# Patient Record
Sex: MALE | Race: WHITE | Employment: FULL TIME | URBAN - METROPOLITAN AREA
[De-identification: names, ages, dates, MRNs, and addresses within clinical notes are randomized per-mention and may not be internally consistent; named-entity substitution may affect disease eponyms.]

---

## 2019-05-15 ENCOUNTER — HOSPITAL ENCOUNTER (OUTPATIENT)
Dept: ULTRASOUND IMAGING | Age: 49
Discharge: HOME | End: 2019-05-15
Attending: FAMILY MEDICINE

## 2019-05-15 DIAGNOSIS — R10.11 RIGHT UPPER QUADRANT ABDOMINAL PAIN: ICD-10-CM

## 2019-05-18 ENCOUNTER — HOSPITAL ENCOUNTER (OUTPATIENT)
Dept: CT IMAGING | Age: 49
Discharge: HOME | End: 2019-05-18
Attending: FAMILY MEDICINE
Payer: COMMERCIAL

## 2019-05-18 DIAGNOSIS — R10.84 GENERALIZED ABDOMINAL PAIN: ICD-10-CM

## 2019-05-18 PROCEDURE — 74150 CT ABDOMEN W/O CONTRAST: CPT

## 2022-01-07 NOTE — PROGRESS NOTES
Called patient to confirm scheduled procedure and notify him of cancellation. No answer at this time. Voicemail message left informing patient to call GI lab back.

## 2022-01-10 ENCOUNTER — HOSPITAL ENCOUNTER (OUTPATIENT)
Age: 52
Setting detail: OUTPATIENT SURGERY
Discharge: HOME | End: 2022-01-10
Attending: SURGERY | Admitting: SURGERY
Payer: COMMERCIAL

## 2022-01-10 ENCOUNTER — ANESTHESIA (OUTPATIENT)
Dept: ENDOSCOPY | Age: 52
End: 2022-01-10
Payer: COMMERCIAL

## 2022-01-10 ENCOUNTER — ANESTHESIA EVENT (OUTPATIENT)
Dept: ENDOSCOPY | Age: 52
End: 2022-01-10
Payer: COMMERCIAL

## 2022-01-10 VITALS
HEART RATE: 76 BPM | SYSTOLIC BLOOD PRESSURE: 120 MMHG | DIASTOLIC BLOOD PRESSURE: 78 MMHG | RESPIRATION RATE: 14 BRPM | TEMPERATURE: 97.7 F | OXYGEN SATURATION: 99 %

## 2022-01-10 DIAGNOSIS — Z12.11 COLON CANCER SCREENING: ICD-10-CM

## 2022-01-10 DIAGNOSIS — K57.30 DIVERTICULAR DISEASE OF LEFT COLON: ICD-10-CM

## 2022-01-10 PROCEDURE — 74011250636 HC RX REV CODE- 250/636: Performed by: ANESTHESIOLOGY

## 2022-01-10 PROCEDURE — 74011000250 HC RX REV CODE- 250: Performed by: REGISTERED NURSE

## 2022-01-10 PROCEDURE — 74011250636 HC RX REV CODE- 250/636: Performed by: REGISTERED NURSE

## 2022-01-10 PROCEDURE — 2709999900 HC NON-CHARGEABLE SUPPLY: Performed by: SURGERY

## 2022-01-10 PROCEDURE — 76060000032 HC ANESTHESIA 0.5 TO 1 HR: Performed by: SURGERY

## 2022-01-10 PROCEDURE — 45378 DIAGNOSTIC COLONOSCOPY: CPT | Performed by: SURGERY

## 2022-01-10 PROCEDURE — 76040000025: Performed by: SURGERY

## 2022-01-10 RX ORDER — SODIUM CHLORIDE, SODIUM LACTATE, POTASSIUM CHLORIDE, CALCIUM CHLORIDE 600; 310; 30; 20 MG/100ML; MG/100ML; MG/100ML; MG/100ML
1000 INJECTION, SOLUTION INTRAVENOUS CONTINUOUS
Status: DISCONTINUED | OUTPATIENT
Start: 2022-01-10 | End: 2022-01-10 | Stop reason: HOSPADM

## 2022-01-10 RX ORDER — GLYCOPYRROLATE 0.2 MG/ML
INJECTION INTRAMUSCULAR; INTRAVENOUS AS NEEDED
Status: DISCONTINUED | OUTPATIENT
Start: 2022-01-10 | End: 2022-01-10 | Stop reason: HOSPADM

## 2022-01-10 RX ORDER — PROPOFOL 10 MG/ML
INJECTION, EMULSION INTRAVENOUS
Status: DISCONTINUED | OUTPATIENT
Start: 2022-01-10 | End: 2022-01-10 | Stop reason: HOSPADM

## 2022-01-10 RX ORDER — LIDOCAINE HYDROCHLORIDE 20 MG/ML
INJECTION, SOLUTION EPIDURAL; INFILTRATION; INTRACAUDAL; PERINEURAL AS NEEDED
Status: DISCONTINUED | OUTPATIENT
Start: 2022-01-10 | End: 2022-01-10 | Stop reason: HOSPADM

## 2022-01-10 RX ORDER — PROPOFOL 10 MG/ML
INJECTION, EMULSION INTRAVENOUS AS NEEDED
Status: DISCONTINUED | OUTPATIENT
Start: 2022-01-10 | End: 2022-01-10 | Stop reason: HOSPADM

## 2022-01-10 RX ADMIN — SODIUM CHLORIDE, SODIUM LACTATE, POTASSIUM CHLORIDE, AND CALCIUM CHLORIDE: 600; 310; 30; 20 INJECTION, SOLUTION INTRAVENOUS at 13:43

## 2022-01-10 RX ADMIN — PROPOFOL 200 MCG/KG/MIN: 10 INJECTION, EMULSION INTRAVENOUS at 13:29

## 2022-01-10 RX ADMIN — GLYCOPYRROLATE 0.2 MG: 0.2 INJECTION INTRAMUSCULAR; INTRAVENOUS at 13:29

## 2022-01-10 RX ADMIN — LIDOCAINE HYDROCHLORIDE 60 MG: 20 INJECTION, SOLUTION EPIDURAL; INFILTRATION; INTRACAUDAL; PERINEURAL at 13:29

## 2022-01-10 RX ADMIN — PROPOFOL 10 MG: 10 INJECTION, EMULSION INTRAVENOUS at 13:30

## 2022-01-10 RX ADMIN — SODIUM CHLORIDE, SODIUM LACTATE, POTASSIUM CHLORIDE, AND CALCIUM CHLORIDE 1000 ML: 600; 310; 30; 20 INJECTION, SOLUTION INTRAVENOUS at 11:51

## 2022-01-10 RX ADMIN — PROPOFOL 60 MG: 10 INJECTION, EMULSION INTRAVENOUS at 13:29

## 2022-01-10 NOTE — H&P
History and Physical      Patient: Maribeth Nissen    Physician: Troy Rivera MD    Referring Physician: Lynsey Selby DO    Chief Complaint: For colonoscopy    History of Present Illness: Pt presents for colonoscopy. Initial Screening for colorectal cancer . History:  Past Medical History:   Diagnosis Date    Hypercholesterolemia     Hypertension      Past Surgical History:   Procedure Laterality Date    HX CHOLECYSTECTOMY  2007 Approx    HX KNEE ARTHROSCOPY Right 1992 Approx      Social History     Socioeconomic History    Marital status:    Tobacco Use    Smoking status: Never Smoker    Smokeless tobacco: Current User    Tobacco comment: Snuff   Vaping Use    Vaping Use: Never used   Substance and Sexual Activity    Alcohol use: Yes     Comment: Social    Drug use: No    Sexual activity: Yes     Partners: Female      Family History   Problem Relation Age of Onset    Emphysema Mother     Anxiety Mother     No Known Problems Father     Bipolar Disorder Brother        Medications:   Prior to Admission medications    Medication Sig Start Date End Date Taking? Authorizing Provider   lisinopril-hydroCHLOROthiazide (PRINZIDE, ZESTORETIC) 20-25 mg per tablet Take 1 Tablet by mouth daily. 10/19/21  Yes Lizeth Body P, DO   amLODIPine (NORVASC) 10 mg tablet Take 1 Tablet by mouth daily. 10/19/21  Yes Lizeth Body P, DO   multivit-min/ferrous fumarate (MULTI VITAMIN PO) Take  by mouth daily. Provider, Historical   cholecalciferol, vitamin D3, (VITAMIN D3 PO) Take  by mouth daily. Provider, Historical       Allergies: No Known Allergies    Physical Exam:     Vital Signs:   Visit Vitals  /76   Pulse (!) 57   Temp 98.8 °F (37.1 °C)   Resp 15   SpO2 96%     .     General: no distress      Heart: regular   Lungs: unlabored   Abdominal: soft   Neurological: Grossly normal        Findings/Diagnosis: Screening for colorectal cancer     Plan of Care/Planned Procedure: Colonoscopy, possible polypectomy. Pt/designee has reviewed the colonoscopy information sheet. Any questions have been discussed. They agree to proceed.       Signed:  Eb Rodriguez MD   1/10/2022

## 2022-01-10 NOTE — PROGRESS NOTES
Patient discharge home at this time via wheelchair with spouse. Vital signs stable on room air. No complaints of pain or discomfort. Peripheral IV removed with cath tip intact (dressing dry/intact). Discharge instructions provided to family and responsible party. MD spoke with family at bedside. All questions answered at this time.

## 2022-01-10 NOTE — DISCHARGE INSTRUCTIONS
Gastrointestinal Colonoscopy/Flexible Sigmoidoscopy - Lower Exam Discharge Instructions  1. Call Dr. Sandie Carrillo for any problems or questions. 2. Contact the doctors office for follow up appointment as directed  3. Medication may cause drowsiness for several hours, therefore:  · Do not drive or operate machinery for reminder of the day. · No alcohol today. · Do not make any important or legal decisions for 24 hours. · Do not sign any legal documents for 24 hours. 4. Ordinarily, you may resume regular diet and activity after exam unless otherwise specified by your physician. 5. Because of air put into your colon during exam, you may experience some abdominal distension, relieved by the passage of gas, for several hours. 6. Contact your physician if you have any of the following:  · Excessive amount of bleeding - large amount when having a bowel movement.   Occasional specks of blood with bowel movement would not be unusual.  · Severe abdominal pain  · Fever or Chills    REPEAT IN 10 YEARS

## 2022-01-10 NOTE — PROCEDURES
Procedure in Detail:  Informed consent was obtained for the procedure. The patient was placed in the left lateral decubitus position and sedation was induced by anesthesia. The scope was inserted into the rectum and advanced under direct vision to the cecum, which was identified by the ileocecal valve and appendiceal orifice. The quality of the colonic preparation was fair, requiring copious, tedious lavage. A careful inspection was made as the colonoscope was withdrawn, including a retroflexed view of the rectum; findings and interventions are described below. Appropriate photodocumentation was obtained. Findings:   Rectum:   Normal  Sigmoid:     - Excavated lesions:     - Diverticulosis  Descending Colon:     - Excavated lesions:     - Diverticulum  Transverse Colon:   Normal  Ascending Colon:   Normal  Cecum:   Normal          Specimens: No specimens were collected. Complications: None; patient tolerated the procedure well. \    EBL - none    Recommendations:   - For colon cancer screening in this average-risk patient, colonoscopy may be repeated in 10 years.      Signed By: Cruz Amaya MD                        January 10, 2022

## 2022-01-10 NOTE — ANESTHESIA PREPROCEDURE EVALUATION
Relevant Problems   CARDIOVASCULAR   (+) Hypertension       Anesthetic History   No history of anesthetic complications            Review of Systems / Medical History  Patient summary reviewed and pertinent labs reviewed    Pulmonary  Within defined limits                 Neuro/Psych   Within defined limits           Cardiovascular    Hypertension              Exercise tolerance: >4 METS     GI/Hepatic/Renal  Within defined limits              Endo/Other  Within defined limits           Other Findings              Physical Exam    Airway  Mallampati: II  TM Distance: 4 - 6 cm  Neck ROM: normal range of motion   Mouth opening: Normal     Cardiovascular    Rhythm: regular  Rate: normal         Dental  No notable dental hx       Pulmonary  Breath sounds clear to auscultation               Abdominal  GI exam deferred       Other Findings            Anesthetic Plan    ASA: 2  Anesthesia type: total IV anesthesia          Induction: Intravenous  Anesthetic plan and risks discussed with: Patient

## 2022-01-10 NOTE — ANESTHESIA POSTPROCEDURE EVALUATION
Procedure(s):  COLONOSCOPY/BMI 29. total IV anesthesia    Anesthesia Post Evaluation      Multimodal analgesia: multimodal analgesia used between 6 hours prior to anesthesia start to PACU discharge  Patient location during evaluation: PACU  Patient participation: complete - patient participated  Level of consciousness: awake  Pain management: adequate  Anesthetic complications: no  Cardiovascular status: acceptable  Respiratory status: acceptable  Hydration status: acceptable  Post anesthesia nausea and vomiting:  none      INITIAL Post-op Vital signs:   Vitals Value Taken Time   /78 01/10/22 1426   Temp 36.5 °C (97.7 °F) 01/10/22 1400   Pulse 54 01/10/22 1426   Resp 15 01/10/22 1410   SpO2 98 % 01/10/22 1426   Vitals shown include unvalidated device data.

## 2022-03-04 ENCOUNTER — NURSE TRIAGE (OUTPATIENT)
Dept: OTHER | Facility: CLINIC | Age: 52
End: 2022-03-04

## 2022-03-04 NOTE — TELEPHONE ENCOUNTER
Received call from [address] [place] [address] at [facility] with Red Flag Complaint. Subjective: Caller states \"I have been having abdominal pain last night and this morning that has woken me up out of my sleep. It is a pain around my belt line and middle groin area. The last time this happened it was on the L side and they said I had diverticulitis. \" Pt dysuria, hematuria, CP, or SOB. Current Symptoms: abdominal pain, constipation    Onset: 1 day ago; intermittent    Associated Symptoms: constipation    Pain Severity: 6/10; stabbing; intermittent    Temperature: Denies   What has been tried: tylenol    LMP: NA Pregnant: NA    Recommended disposition: See in Office Today. Pt advised to go to [address] [place] [address] if no available appointments. Care advice provided, patient verbalizes understanding; denies any other questions or concerns; instructed to call back for any new or worsening symptoms. Patient/Caller agrees with recommended disposition; writer provided warm transfer to [facility] for appointment scheduling    Attention Provider: Thank you for allowing me to participate in the care of your patient. The patient was connected to triage in response to information provided to the [facility]. Please do not respond through this encounter as the response is not directed to a shared pool.     Reason for Disposition   MODERATE pain (e.g., interferes with normal activities) that comes and goes (cramps) lasts > 24 hours  (Exception: pain with Vomiting or Diarrhea - see that Protocol)    Protocols used: ABDOMINAL PAIN - MALE-ADULT-[place]

## 2022-05-03 DIAGNOSIS — Z00.00 ROUTINE GENERAL MEDICAL EXAMINATION AT A HEALTH CARE FACILITY: Primary | ICD-10-CM

## 2022-10-27 ENCOUNTER — NURSE ONLY (OUTPATIENT)
Dept: FAMILY MEDICINE CLINIC | Facility: CLINIC | Age: 52
End: 2022-10-27

## 2022-10-27 DIAGNOSIS — Z00.00 ROUTINE GENERAL MEDICAL EXAMINATION AT A HEALTH CARE FACILITY: ICD-10-CM

## 2022-10-27 LAB
25(OH)D3 SERPL-MCNC: 37.6 NG/ML (ref 30–100)
ALBUMIN SERPL-MCNC: 4.4 G/DL (ref 3.5–5)
ALBUMIN/GLOB SERPL: 1.3 {RATIO} (ref 0.4–1.6)
ALP SERPL-CCNC: 58 U/L (ref 50–136)
ALT SERPL-CCNC: 31 U/L (ref 12–65)
ANION GAP SERPL CALC-SCNC: 2 MMOL/L (ref 2–11)
APPEARANCE UR: CLEAR
AST SERPL-CCNC: 14 U/L (ref 15–37)
BASOPHILS # BLD: 0 K/UL (ref 0–0.2)
BASOPHILS NFR BLD: 1 % (ref 0–2)
BILIRUB SERPL-MCNC: 0.6 MG/DL (ref 0.2–1.1)
BILIRUB UR QL: NEGATIVE
BUN SERPL-MCNC: 14 MG/DL (ref 6–23)
CALCIUM SERPL-MCNC: 9.7 MG/DL (ref 8.3–10.4)
CHLORIDE SERPL-SCNC: 104 MMOL/L (ref 101–110)
CHOLEST SERPL-MCNC: 213 MG/DL
CO2 SERPL-SCNC: 29 MMOL/L (ref 21–32)
COLOR UR: NORMAL
CREAT SERPL-MCNC: 1 MG/DL (ref 0.8–1.5)
DIFFERENTIAL METHOD BLD: ABNORMAL
EOSINOPHIL # BLD: 0.3 K/UL (ref 0–0.8)
EOSINOPHIL NFR BLD: 8 % (ref 0.5–7.8)
ERYTHROCYTE [DISTWIDTH] IN BLOOD BY AUTOMATED COUNT: 12.4 % (ref 11.9–14.6)
GLOBULIN SER CALC-MCNC: 3.3 G/DL (ref 2.8–4.5)
GLUCOSE SERPL-MCNC: 74 MG/DL (ref 65–100)
GLUCOSE UR STRIP.AUTO-MCNC: NEGATIVE MG/DL
HCT VFR BLD AUTO: 43.5 % (ref 41.1–50.3)
HDLC SERPL-MCNC: 58 MG/DL (ref 40–60)
HDLC SERPL: 3.7 {RATIO}
HGB BLD-MCNC: 15.1 G/DL (ref 13.6–17.2)
HGB UR QL STRIP: NEGATIVE
IMM GRANULOCYTES # BLD AUTO: 0 K/UL (ref 0–0.5)
IMM GRANULOCYTES NFR BLD AUTO: 0 % (ref 0–5)
KETONES UR QL STRIP.AUTO: NEGATIVE MG/DL
LDLC SERPL CALC-MCNC: 115 MG/DL
LEUKOCYTE ESTERASE UR QL STRIP.AUTO: NEGATIVE
LYMPHOCYTES # BLD: 0.9 K/UL (ref 0.5–4.6)
LYMPHOCYTES NFR BLD: 25 % (ref 13–44)
MCH RBC QN AUTO: 35.1 PG (ref 26.1–32.9)
MCHC RBC AUTO-ENTMCNC: 34.7 G/DL (ref 31.4–35)
MCV RBC AUTO: 101.2 FL (ref 82–102)
MONOCYTES # BLD: 0.4 K/UL (ref 0.1–1.3)
MONOCYTES NFR BLD: 9 % (ref 4–12)
NEUTS SEG # BLD: 2.1 K/UL (ref 1.7–8.2)
NEUTS SEG NFR BLD: 57 % (ref 43–78)
NITRITE UR QL STRIP.AUTO: NEGATIVE
NRBC # BLD: 0 K/UL (ref 0–0.2)
PH UR STRIP: 7 [PH] (ref 5–9)
PLATELET # BLD AUTO: 232 K/UL (ref 150–450)
PMV BLD AUTO: 9.7 FL (ref 9.4–12.3)
POTASSIUM SERPL-SCNC: 4.2 MMOL/L (ref 3.5–5.1)
PROT SERPL-MCNC: 7.7 G/DL (ref 6.3–8.2)
PROT UR STRIP-MCNC: NEGATIVE MG/DL
RBC # BLD AUTO: 4.3 M/UL (ref 4.23–5.6)
SODIUM SERPL-SCNC: 135 MMOL/L (ref 133–143)
SP GR UR REFRACTOMETRY: 1.02 (ref 1–1.02)
TRIGL SERPL-MCNC: 200 MG/DL (ref 35–150)
TSH, 3RD GENERATION: 1.16 UIU/ML (ref 0.36–3.74)
UROBILINOGEN UR QL STRIP.AUTO: 0.2 EU/DL (ref 0.2–1)
VLDLC SERPL CALC-MCNC: 40 MG/DL (ref 6–23)
WBC # BLD AUTO: 3.7 K/UL (ref 4.3–11.1)

## 2022-10-28 LAB
PSA FREE MFR SERPL: 57.1 %
PSA FREE SERPL-MCNC: 0.4 NG/ML
PSA SERPL-MCNC: 0.7 NG/ML

## 2022-11-03 ENCOUNTER — OFFICE VISIT (OUTPATIENT)
Dept: FAMILY MEDICINE CLINIC | Facility: CLINIC | Age: 52
End: 2022-11-03
Payer: COMMERCIAL

## 2022-11-03 VITALS
DIASTOLIC BLOOD PRESSURE: 70 MMHG | SYSTOLIC BLOOD PRESSURE: 100 MMHG | BODY MASS INDEX: 28.1 KG/M2 | WEIGHT: 226 LBS | HEIGHT: 75 IN

## 2022-11-03 DIAGNOSIS — I10 ESSENTIAL (PRIMARY) HYPERTENSION: ICD-10-CM

## 2022-11-03 DIAGNOSIS — E78.00 PURE HYPERCHOLESTEROLEMIA, UNSPECIFIED: ICD-10-CM

## 2022-11-03 DIAGNOSIS — R13.10 DYSPHAGIA, UNSPECIFIED TYPE: ICD-10-CM

## 2022-11-03 DIAGNOSIS — G47.33 OBSTRUCTIVE SLEEP APNEA: ICD-10-CM

## 2022-11-03 DIAGNOSIS — Z13.31 SCREENING FOR DEPRESSION: ICD-10-CM

## 2022-11-03 DIAGNOSIS — Z00.00 ROUTINE GENERAL MEDICAL EXAMINATION AT A HEALTH CARE FACILITY: Primary | ICD-10-CM

## 2022-11-03 PROCEDURE — 99396 PREV VISIT EST AGE 40-64: CPT | Performed by: FAMILY MEDICINE

## 2022-11-03 PROCEDURE — 3074F SYST BP LT 130 MM HG: CPT | Performed by: FAMILY MEDICINE

## 2022-11-03 PROCEDURE — 3078F DIAST BP <80 MM HG: CPT | Performed by: FAMILY MEDICINE

## 2022-11-03 RX ORDER — AMLODIPINE BESYLATE 10 MG/1
10 TABLET ORAL DAILY
Qty: 90 TABLET | Refills: 3 | Status: SHIPPED | OUTPATIENT
Start: 2022-11-03

## 2022-11-03 RX ORDER — MELATONIN
DAILY
COMMUNITY

## 2022-11-03 RX ORDER — LISINOPRIL AND HYDROCHLOROTHIAZIDE 25; 20 MG/1; MG/1
1 TABLET ORAL DAILY
Qty: 90 TABLET | Refills: 3 | Status: SHIPPED | OUTPATIENT
Start: 2022-11-03

## 2022-11-03 ASSESSMENT — PATIENT HEALTH QUESTIONNAIRE - PHQ9
SUM OF ALL RESPONSES TO PHQ9 QUESTIONS 1 & 2: 0
1. LITTLE INTEREST OR PLEASURE IN DOING THINGS: 0
SUM OF ALL RESPONSES TO PHQ QUESTIONS 1-9: 0
2. FEELING DOWN, DEPRESSED OR HOPELESS: 0
SUM OF ALL RESPONSES TO PHQ QUESTIONS 1-9: 0

## 2022-11-03 ASSESSMENT — ENCOUNTER SYMPTOMS
CONSTIPATION: 0
COUGH: 0
DIARRHEA: 0
WHEEZING: 0
ABDOMINAL PAIN: 0
VOMITING: 0
SHORTNESS OF BREATH: 0
SORE THROAT: 0
NAUSEA: 0

## 2022-11-03 NOTE — PROGRESS NOTES
PROGRESS NOTE    SUBJECTIVE:   Tara Choudhary is a 47 y.o. male seen for a follow up visit regarding the following chief complaint:     Chief Complaint   Patient presents with    Annual Exam    Discuss Labs           HPI patient presents to the office today for complete physical and states he went to the [place] wants me to work him up for his dysphagia possibly being related to being in [facility] also wants me to work-up his sleep apnea which we tried getting a sleep study but his insurance denied it and is also complaining about knee pain but he is being worked up at the [place] for that    Past Medical History, Past Surgical History, Family history, Social History, and Medications were all reviewed with the patient today and updated as necessary. Current Outpatient Medications   Medication Sig Dispense Refill    vitamin D3 (CHOLECALCIFEROL) 25 MCG (1000 UT) TABS tablet Take by mouth daily      Multiple Vitamin (MULTI-VITAMIN DAILY PO) Take by mouth daily      amLODIPine (NORVASC) 10 MG tablet Take 1 tablet by mouth daily 90 tablet 3    lisinopril-hydroCHLOROthiazide (PRINZIDE;ZESTORETIC) 20-25 MG per tablet Take 1 tablet by mouth daily 90 tablet 3     No current facility-administered medications for this visit.      No Known Allergies  Patient Active Problem List   Diagnosis    BMI 30.0-30.9,adult    Hypercholesterolemia    Hypertension     Past Medical History:   Diagnosis Date    Hypercholesterolemia     Hypertension      Past Surgical History:   Procedure Laterality Date    CHOLECYSTECTOMY  2007 Approx    COLONOSCOPY N/A 1/10/2022    COLONOSCOPY/BMI 29 performed by Nhung Chapman MD at [facility] ENDOSCOPY    COLONOSCOPY FLX DX W/COLLJ SPEC WHEN PFRMD  1/10/2022         KNEE ARTHROSCOPY Right 1992 Approx     Family History   Problem Relation Age of Onset    Emphysema Mother     Anxiety Disorder Mother     No Known Problems Father     Bipolar Disorder Brother      Social History     Tobacco Use Smoking status: Never     Passive exposure: Never    Smokeless tobacco: Current    Tobacco comments:     Quit smoking: Snuff   Substance Use Topics    Alcohol use: Yes         Review of Systems   Constitutional:  Positive for fatigue. Negative for chills and fever. HENT:  Negative for sore throat. Obstructive sleep apneic phases witnessed by his wife and falling asleep while driving   Eyes:  Negative for visual disturbance. Respiratory:  Negative for cough, shortness of breath and wheezing. Cardiovascular:  Negative for chest pain and palpitations. Gastrointestinal:  Negative for abdominal pain, constipation, diarrhea, nausea and vomiting. Dysphagia   Endocrine: Negative for cold intolerance and heat intolerance. Genitourinary:  Negative for decreased urine volume, dysuria, penile discharge and testicular pain. Musculoskeletal:  Negative for arthralgias and myalgias. Skin:  Negative for rash. Neurological:  Negative for weakness and light-headedness. Psychiatric/Behavioral: Negative. OBJECTIVE:  /70 (Site: Left Upper Arm, Position: Sitting, Cuff Size: Large Adult)   Ht 6' 3\" (1.905 m)   Wt 226 lb (102.5 kg)   BMI 28.25 kg/m²      Physical Exam  Vitals and nursing note reviewed. Constitutional:       Appearance: Normal appearance. HENT:      Head: Normocephalic and atraumatic. Right Ear: Tympanic membrane normal.      Left Ear: Tympanic membrane normal.      Nose: Nose normal.      Mouth/Throat:      Mouth: Mucous membranes are moist.      Pharynx: No oropharyngeal exudate or posterior oropharyngeal erythema. Eyes:      Extraocular Movements: Extraocular movements intact. Conjunctiva/sclera: Conjunctivae normal.      Pupils: Pupils are equal, round, and reactive to light. Cardiovascular:      Rate and Rhythm: Normal rate and regular rhythm. Pulses: Normal pulses. Heart sounds: Normal heart sounds.    Pulmonary:      Effort: Pulmonary Cholesterol Calculated 40 (H) 6.0 - 23.0 MG/DL    Chol/HDL Ratio 3.7     Comprehensive Metabolic Panel    Collection Time: 10/27/22  9:24 AM   Result Value Ref Range    Sodium 135 133 - 143 mmol/L    Potassium 4.2 3.5 - 5.1 mmol/L    Chloride 104 101 - 110 mmol/L    CO2 29 21 - 32 mmol/L    Anion Gap 2 2 - 11 mmol/L    Glucose 74 65 - 100 mg/dL    BUN 14 6 - 23 MG/DL    Creatinine 1.00 0.8 - 1.5 MG/DL    Est, Glom Filt Rate >60 >60 ml/min/1.73m2    Calcium 9.7 8.3 - 10.4 MG/DL    Total Bilirubin 0.6 0.2 - 1.1 MG/DL    ALT 31 12 - 65 U/L    AST 14 (L) 15 - 37 U/L    Alk Phosphatase 58 50 - 136 U/L    Total Protein 7.7 6.3 - 8.2 g/dL    Albumin 4.4 3.5 - 5.0 g/dL    Globulin 3.3 2.8 - 4.5 g/dL    Albumin/Globulin Ratio 1.3 0.4 - 1.6     CBC with Auto Differential    Collection Time: 10/27/22  9:24 AM   Result Value Ref Range    WBC 3.7 (L) 4.3 - 11.1 K/uL    RBC 4.30 4.23 - 5.6 M/uL    Hemoglobin 15.1 13.6 - 17.2 g/dL    Hematocrit 43.5 41.1 - 50.3 %    .2 82 - 102 FL    MCH 35.1 (H) 26.1 - 32.9 PG    MCHC 34.7 31.4 - 35.0 g/dL    RDW 12.4 11.9 - 14.6 %    Platelets 021 026 - 975 K/uL    MPV 9.7 9.4 - 12.3 FL    nRBC 0.00 0.0 - 0.2 K/uL    Differential Type AUTOMATED      Seg Neutrophils 57 43 - 78 %    Lymphocytes 25 13 - 44 %    Monocytes 9 4.0 - 12.0 %    Eosinophils % 8 (H) 0.5 - 7.8 %    Basophils 1 0.0 - 2.0 %    Immature Granulocytes 0 0.0 - 5.0 %    Segs Absolute 2.1 1.7 - 8.2 K/UL    Absolute Lymph # 0.9 0.5 - 4.6 K/UL    Absolute Mono # 0.4 0.1 - 1.3 K/UL    Absolute Eos # 0.3 0.0 - 0.8 K/UL    Basophils Absolute 0.0 0.0 - 0.2 K/UL    Absolute Immature Granulocyte 0.0 0.0 - 0.5 K/UL   PSA, Total and Free    Collection Time: 10/27/22  9:24 AM   Result Value Ref Range    PSA 0.7 <4.0 ng/mL    PSA, Free 0.4 ng/mL    PSA, Free Pct 57.1 %       ASSESSMENT and PLAN    Visit Diagnoses and Associated Orders       Routine general medical examination at a health care facility    -  Primary    PSA Screening [ Custom]   - Future Order    AMB POC URINALYSIS DIP STICK MANUAL W/O MICRO [03884 CPT(R)]   - Future Order    Hepatitis C Antibody [[id number] Custom]   - Future Order    Vitamin D 25 Hydroxy [[id number] Custom]   - Future Order    Lipid Panel [[id number] Custom]   - Future Order    Comprehensive Metabolic Panel [[id number] Custom]   - Future Order    HIV 1/2 Ag/Ab, 4TH Generation,W Rflx Confirm [01163 CPT(R)]   - Future Order    CBC with Auto Differential [[id number] Custom]   - Future Order    TSH with Reflex [[id number] Custom]   - Future Order         Pure hypercholesterolemia, unspecified        Lipid Panel [[id number] Custom]   - Future Order         Essential (primary) hypertension        amLODIPine (NORVASC) 10 MG tablet [9069]      lisinopril-hydroCHLOROthiazide (PRINZIDE;ZESTORETIC) 20-25 MG per tablet [[id number]]           Screening for depression             Dysphagia, unspecified type        [facility] [[id number] Custom]           Obstructive sleep apnea        [facility] [[id number] Custom]           ORDERS WITHOUT AN ASSOCIATED DIAGNOSIS    vitamin D3 (CHOLECALCIFEROL) 25 MCG (1000 UT) TABS tablet [65730]      Multiple Vitamin (MULTI-VITAMIN DAILY PO) [020717]                  Diagnosis Orders   1. Routine general medical examination at a health care facility  PSA Screening    AMB POC URINALYSIS DIP STICK MANUAL W/O MICRO    Hepatitis C Antibody    Vitamin D 25 Hydroxy    Lipid Panel    Comprehensive Metabolic Panel    HIV 1/2 Ag/Ab, 4TH Generation,W Rflx Confirm    CBC with Auto Differential    TSH with Reflex      2. Pure hypercholesterolemia, unspecified  Lipid Panel      3. Essential (primary) hypertension  amLODIPine (NORVASC) 10 MG tablet    lisinopril-hydroCHLOROthiazide (PRINZIDE;ZESTORETIC) 20-25 MG per tablet      4. Screening for depression        5. Dysphagia, unspecified type  [facility]      6.  Obstructive sleep apnea  [facility]  Gama Camejo was seen today for annual exam and discuss labs. Diagnoses and all orders for this visit:    Routine general medical examination at a health care facility  -     PSA Screening; Future  -     AMB POC URINALYSIS DIP STICK MANUAL W/O MICRO; Future  -     Hepatitis C Antibody; Future  -     Vitamin D 25 Hydroxy; Future  -     Lipid Panel; Future  -     Comprehensive Metabolic Panel; Future  -     HIV 1/2 Ag/Ab, 4TH Generation,W Rflx Confirm; Future  -     CBC with Auto Differential; Future  -     TSH with Reflex; Future    Pure hypercholesterolemia, unspecified  -     Lipid Panel; Future    Essential (primary) hypertension  -     amLODIPine (NORVASC) 10 MG tablet; Take 1 tablet by mouth daily  -     lisinopril-hydroCHLOROthiazide (PRINZIDE;ZESTORETIC) 20-25 MG per tablet; Take 1 tablet by mouth daily    Screening for depression    Dysphagia, unspecified type  -     [facility]    Obstructive sleep apnea  -     [facility]  , Otherwise normal routine physical exam reviewed his labs answered all his questions counseling supportive care recommended rechecking his cholesterol in 6 months and call him back with results and plan. I have spent a total of 8-15 minutes assessing, reviewing, and discussing the depression screening with patient in office today.    Otherwise we will see him back next year for his physical none

## 2022-12-12 ENCOUNTER — TRANSCRIBE ORDERS (OUTPATIENT)
Dept: SCHEDULING | Age: 52
End: 2022-12-12

## 2022-12-12 DIAGNOSIS — R13.12 OROPHARYNGEAL DYSPHAGIA: Primary | ICD-10-CM

## 2022-12-16 ENCOUNTER — HOSPITAL ENCOUNTER (OUTPATIENT)
Dept: SLEEP CENTER | Age: 52
Discharge: HOME | End: 2022-12-19
Payer: COMMERCIAL

## 2022-12-16 PROCEDURE — 95806 SLEEP STUDY UNATT&RESP EFFT: CPT

## 2022-12-21 ENCOUNTER — TELEPHONE (OUTPATIENT)
Dept: SLEEP MEDICINE | Age: 52
End: 2022-12-21

## 2023-01-04 ENCOUNTER — HOSPITAL ENCOUNTER (OUTPATIENT)
Dept: GENERAL RADIOLOGY | Age: 53
Discharge: HOME | End: 2023-01-07
Payer: COMMERCIAL

## 2023-01-04 DIAGNOSIS — R13.12 OROPHARYNGEAL DYSPHAGIA: ICD-10-CM

## 2023-01-04 PROCEDURE — 74220 X-RAY XM ESOPHAGUS 1CNTRST: CPT

## 2023-01-04 PROCEDURE — 6360000004 HC RX CONTRAST MEDICATION: Performed by: NURSE PRACTITIONER

## 2023-01-04 PROCEDURE — 2500000003 HC RX 250 WO HCPCS: Performed by: NURSE PRACTITIONER

## 2023-01-04 PROCEDURE — A4641 RADIOPHARM DX AGENT NOC: HCPCS | Performed by: NURSE PRACTITIONER

## 2023-01-04 RX ADMIN — BARIUM SULFATE 140 ML: 980 POWDER, FOR SUSPENSION ORAL at 09:12

## 2023-01-04 RX ADMIN — BARIUM SULFATE 1 TABLET: 700 TABLET ORAL at 09:12

## 2023-01-04 RX ADMIN — BARIUM SULFATE 200 ML: 0.6 SUSPENSION ORAL at 09:12

## 2023-02-08 NOTE — PROGRESS NOTES
[address] [place] [address] [facility], [address] [facility]  [phone]    Patient Name:  Zaira Mckeon  YOB: 1970      Office Visit 2/10/2023    CHIEF COMPLAINT:    Chief Complaint   Patient presents with    Sleep Apnea       HISTORY OF PRESENT ILLNESS:      The patient is a 43-year-old male who presents in outpatient consultation at the request of Dr. Dmitriy Rahman for management of obstructive sleep apnea. Significant PMH of hypertension and hypercholesterolemia. The diagnostic polysomnography was notable for an apnea hypopnea index of 5.6 including 16 obstructive apneas and 25 hypopneas. Oxygen desaturations are low as 78% were noted with SpO2 less than 89% for a total of 10.8 minutes of the test.  Significant cardiac arrhythmias were not evident. He reports that he has a long history of his wife telling him that he snores. He denies being excessively sleepy during the day with an [place] score of 7/24. He does report that he has been known to be very irritable in the mornings for years and he awakens in the morning at least 3-4 times per week with headaches. States that he never feels fully rested after a night sleep. Reports that he does have some cramping of his legs at night while trying to sleep but denies any cramping or fidgetiness of his legs during the day. States that he generally does not have any problems going to sleep initially but he does awaken 3-4 times during the night to go to the bathroom. He does report that he will often awaken in the morning with a dry mouth or sore throat. States that he does drink about 6 beers on average per week. He also dips tobacco.  He denies any illicit drug use. Does report that he drinks 1 energy drink every morning but then just drinks water the rest of the day. He does not take any medications to help him sleep. He has a normal weight. His blood pressure is controlled today.   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              Sleepiness Scale:   Sleep Medicine 2/10/2023   Sitting and reading 1   Watching TV 1   Sitting, inactive in a public place (e.g. a theatre or a meeting) 0   As a passenger in a car for an hour without a break 1   Lying down to rest in the afternoon when circumstances permit 3   Sitting and talking to someone 0   Sitting quietly after a lunch without alcohol 1   In a car, while stopped for a few minutes in traffic 0   [place] Sleepiness Score 7        Past Medical History:   Diagnosis Date    Hypercholesterolemia     Hypertension          Patient Active Problem List   Diagnosis    BMI 30.0-30.9,adult    Hypercholesterolemia    Hypertension    CHARLES (obstructive sleep apnea)    Nocturnal hypoxemia    Non-restorative sleep    Snoring    Morning headache           Past Surgical History:   Procedure Laterality Date    CHOLECYSTECTOMY  2007 Approx    COLONOSCOPY N/A 1/10/2022    COLONOSCOPY/BMI 29 performed by Inocencia Bonilla MD at [facility] ENDOSCOPY    COLONOSCOPY FLX DX W/COLLJ SPEC WHEN PFRMD  1/10/2022         KNEE ARTHROSCOPY Right 1992 Approx         Social History     Socioeconomic History    Marital status:      Spouse name: Not on file    Number of children: Not on file    Years of education: Not on file    Highest education level: Not on file   Occupational History    Not on file   Tobacco Use    Smoking status: Never     Passive exposure: Never    Smokeless tobacco: Current    Tobacco comments:     Quit smoking: Snuff   Vaping Use    Vaping Use: Never used   Substance and Sexual Activity    Alcohol use: Yes    Drug use: No    Sexual activity: Not on file   Other Topics Concern    Not on file   Social History Narrative    Not on file     Social Determinants of Health     Financial Resource Strain: Not on file   Food Insecurity: Not on file   Transportation Needs: Not on file   Physical Activity: Not on file   Stress: Not on file   Social Connections: Not on file   Intimate Partner Violence: Not on file   Housing Stability: Not on file         Family History   Problem Relation Age of Onset    Emphysema Mother     Anxiety Disorder Mother     No Known Problems Father     Bipolar Disorder Brother          No Known Allergies      Current Outpatient Medications   Medication Sig    omeprazole (PRILOSEC) 20 MG delayed release capsule Take 40 mg by mouth daily    vitamin D3 (CHOLECALCIFEROL) 25 MCG (1000 UT) TABS tablet Take by mouth daily    Multiple Vitamin (MULTI-VITAMIN DAILY PO) Take by mouth daily    amLODIPine (NORVASC) 10 MG tablet Take 1 tablet by mouth daily    lisinopril-hydroCHLOROthiazide (PRINZIDE;ZESTORETIC) 20-25 MG per tablet Take 1 tablet by mouth daily     No current facility-administered medications for this visit. REVIEW OF SYSTEMS:   CONSTITUTIONAL:   There is no history of fever, chills, night sweats, weight loss, weight gain, persistent fatigue, or lethargy/hypersomnolence. EYES:   Denies problems with eye pain, erythema, blurred vision, or visual field loss. ENTM:   Denies history of tinnitus, epistaxis, sore throat, hoarseness, or dysphonia. LYMPH:   Denies swollen glands. CARDIAC:   No chest pain, pressure, discomfort, palpitations, orthopnea, murmurs, or edema. GI:   No dysphagia, heartburn reflux, nausea/vomiting, diarrhea, abdominal pain, or bleeding. :   Denies history of dysuria, hematuria, polyuria, or decreased urine output. MS:   No history of myalgias, arthralgias, bone pain, or muscle cramps. SKIN:   No history of rashes, jaundice, cyanosis, nodules, or ulcers. ENDO:   Negative for heat or cold intolerance. No history of DM. PSYCH:   Negative for anxiety, depression, insomnia, hallucinations. NEURO:   There is no history of AMS, persistent headache, decreased level of consciousness, seizures, or motor or sensory deficits.       PHYSICAL EXAM:    Vitals:    02/10/23 0854   BP: 105/74   Temp: 96.9 °F (36.1 °C)   TempSrc: Skin   SpO2: 100%   Weight: 233 lb (105.7 kg)   Height: 6' 2.5\" (1.892 m)     Body mass index is 29.52 kg/m². GENERAL APPEARANCE:   The patient is normal weight and in no respiratory distress. HEENT:   PERRL. Conjunctivae unremarkable. Nasal mucosa is without epistaxis, exudate, or polyps. Nares patent bilateral.  Gums and dentition are unremarkable. There is oropharyngeal narrowing. Bradley score 3. NECK/LYMPHATIC:   Symmetrical with no elevation of jugular venous pulsation. Trachea midline. No thyroid enlargement. No cervical adenopathy. LUNGS:   Normal respiratory effort with symmetrical lung expansion. Breath sounds clear. HEART:   There is a regular rate and rhythm. No murmur, rub, or gallop. There is no edema in the lower extremities. ABDOMEN:   Soft and non-tender. No hepatosplenomegaly. Bowel sounds are normal.     SKIN:   There are no rashes, cyanosis, jaundice, or ecchymosis present. EXTREMITIES:   The extremities are unremarkable without clubbing, cyanosis, joint inflammation, degenerative, or ischemic change. MUSCULOSKELETAL:   There is no abnormal tone, muscle atrophy, or abnormal movement present. NEURO:   The patient is alert and oriented to person, place, and time. Memory appears intact and mood is normal.  No gross sensorimotor deficits are present. ASSESSMENT:  (Medical Decision Making)         ICD-10-CM    1. CHARLES (obstructive sleep apnea)  G47.33 DME - DURABLE MEDICAL EQUIPMENT -mild sleep apnea with an AHI of 5.6 and lowest oxygen saturation of 78%. The pathophysiology of obstructive sleep apnea was reviewed with the patient. It's potential to promote severe neurologic, cardiac, pulmonary, and gastrointestinal problems was discussed.  Specifically, the increased incidence of hypertension, coronary artery disease, congestive heart failure, pulmonary hypertension, gastroesophageal reflux, pathologic hypersomnolence, memory loss, and glucose intolerance was related to the consequences of hypoxemia, hypercapnia, airway obstruction, and sympathetic overdrive. We also discussed the ability of nasal CPAP to correct these abnormalities through maintenance of a patent airway. Therapeutic options including surgery, oral appliances, and weight loss were also reviewed. Patient would like to start CPAP therapy for treatment and has many friends who wear CPAP and I have told him that it makes a big difference in how you feel. 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 2. Nocturnal hypoxemia  G47.34 Mild. We will check overnight oximetry once patient is compliant and tolerating CPAP      3. Non-restorative sleep  G47.8 Should improve with CPAP therapy      4. Snoring  R06.83 Start CPAP therapy. Positional therapy to ensure you sleep in side-lying position and avoid sleeping supine      5. Morning headache  R51.9 Should resolve with correction of sleep apnea and hypoxemia using CPAP therapy      6.  RLS (restless legs syndrome)  G25.81 We will reevaluate if the leg cramps have improved after controlling sleep apnea              PLAN:    Start CPAP therapy 5-15 cm H2O nightly compliance  New CPAP set up and supplies ordered through [email or web address]  Recommendations as above  Follow-up in 3 months or sooner as needed         Orders Placed This Encounter   Procedures    DME - [facility]  Phone: [address] [address]  Dept: [phone]      Patient Name: Geri Salazar  : 1970  Gender: male  Address: [address] [place] [id number]  Patient phone number: [phone] (home)       Primary Insurance: Payor: Ferol Epley / Plan: Ferol Epley / Product Type: *No Product type* /   Subscriber ID: [id number] - (Commercial)      AMB Supply Order  Order Details     DME Location: [facility]   Order Date: 2/10/2023   The primary encounter diagnosis was CHARLES (obstructive sleep apnea). Diagnoses of Nocturnal hypoxemia, Non-restorative sleep, Snoring, and Morning headache were also pertinent to this visit.           (  X   )New Set-Up     CPAP machine   (     ) CPAP Unit  (  x   ) Auto CPAP Unit  (     ) BiLevel Unit  (     ) Auto BiLevel Unit  (     ) ASV   (     ) Bilevel ST    (     ) Oxygen Concentrator         Length of need: 12 months    Pressure: 5-15  cmH20  EPR: 2     Starting Ramp Pressure:  4 cm H20  Ramp Time: min  15    Patient had a diagnostic Apnea Hypopnea Index (AHI) of :  5.6    *SUPPLIES* Replace all as needed, or per coverage guidelines     Masks Type:    (  x   ) -Full Face Mask (1 per 3 mon)   <<< please provide Resmed Airfit F30i >>>  ( x    ) -Full Mask (1 per month) Interface/Cushion      (     ) -Nasal Mask (1 per 3 mon)  (     ) - Nasal Mask (1 per month) Interface/Cushion  (     ) -Pillow (2 per mon)  (     ) -Yqaabokpz (1 per 6 mon)      _________________________________________________________________          Other Supplies:    (  X   )-Izlgftji (1 per 6 mon)  ( X    )-Gagjeo Tubing (1 per 3 mon)  (  X   )- Disposable Filter (2 per mon)  (   X  )-Mljgrw Humidifier (1 per year)     (  x   )-Wstmakgpp (sometimes used with Full Face Mask) (1 per 6 mos)  (     )-Tubing-without heat (1 per 3 mos)  ( X   )-Non-Disposable Filter (1 per 6 mos)  (   x  )-Water Chamber (1 per 6 mos)  (     )-Humidifier non-heated (1 per 5 yrs)      Signed Date: 2/10/2023  Electronically Signed By: HELGA Rosas - RICKIE         Collaborating Physician: Dr. Lizbeth Velazquez     Over 50% of today's office visit was spent in face to face time reviewing test results, prognosis, importance of compliance, education about disease process, benefits of medications, instructions for management of acute flare-ups, and follow up plans. Total face to face time spent with patient was 40 minutes.     [address], APRN - CNP  Electronically signed

## 2023-02-10 ENCOUNTER — OFFICE VISIT (OUTPATIENT)
Dept: SLEEP MEDICINE | Age: 53
End: 2023-02-10
Payer: COMMERCIAL

## 2023-02-10 VITALS
SYSTOLIC BLOOD PRESSURE: 105 MMHG | HEIGHT: 75 IN | DIASTOLIC BLOOD PRESSURE: 74 MMHG | OXYGEN SATURATION: 100 % | TEMPERATURE: 96.9 F | WEIGHT: 233 LBS | BODY MASS INDEX: 28.97 KG/M2

## 2023-02-10 DIAGNOSIS — G47.33 OSA (OBSTRUCTIVE SLEEP APNEA): Primary | ICD-10-CM

## 2023-02-10 DIAGNOSIS — G25.81 RLS (RESTLESS LEGS SYNDROME): ICD-10-CM

## 2023-02-10 DIAGNOSIS — R51.9 MORNING HEADACHE: ICD-10-CM

## 2023-02-10 DIAGNOSIS — G47.34 NOCTURNAL HYPOXEMIA: ICD-10-CM

## 2023-02-10 DIAGNOSIS — R06.83 SNORING: ICD-10-CM

## 2023-02-10 DIAGNOSIS — G47.8 NON-RESTORATIVE SLEEP: ICD-10-CM

## 2023-02-10 PROCEDURE — 3074F SYST BP LT 130 MM HG: CPT | Performed by: NURSE PRACTITIONER

## 2023-02-10 PROCEDURE — 3078F DIAST BP <80 MM HG: CPT | Performed by: NURSE PRACTITIONER

## 2023-02-10 PROCEDURE — 99213 OFFICE O/P EST LOW 20 MIN: CPT | Performed by: NURSE PRACTITIONER

## 2023-02-10 RX ORDER — OMEPRAZOLE 20 MG/1
40 CAPSULE, DELAYED RELEASE ORAL DAILY
COMMUNITY

## 2023-02-10 ASSESSMENT — SLEEP AND FATIGUE QUESTIONNAIRES
HOW LIKELY ARE YOU TO NOD OFF OR FALL ASLEEP IN A CAR, WHILE STOPPED FOR A FEW MINUTES IN TRAFFIC: 0
ESS TOTAL SCORE: 7
HOW LIKELY ARE YOU TO NOD OFF OR FALL ASLEEP WHILE LYING DOWN TO REST IN THE AFTERNOON WHEN CIRCUMSTANCES PERMIT: 3
HOW LIKELY ARE YOU TO NOD OFF OR FALL ASLEEP WHILE SITTING AND READING: 1
HOW LIKELY ARE YOU TO NOD OFF OR FALL ASLEEP WHILE SITTING QUIETLY AFTER LUNCH WITHOUT ALCOHOL: 1
HOW LIKELY ARE YOU TO NOD OFF OR FALL ASLEEP WHEN YOU ARE A PASSENGER IN A CAR FOR AN HOUR WITHOUT A BREAK: 1
HOW LIKELY ARE YOU TO NOD OFF OR FALL ASLEEP WHILE SITTING AND TALKING TO SOMEONE: 0
HOW LIKELY ARE YOU TO NOD OFF OR FALL ASLEEP WHILE SITTING INACTIVE IN A PUBLIC PLACE: 0
HOW LIKELY ARE YOU TO NOD OFF OR FALL ASLEEP WHILE WATCHING TV: 1

## 2023-02-10 NOTE — PATIENT INSTRUCTIONS
Start CPAP therapy 5-15 cm H2O nightly compliance  New CPAP set up and supplies ordered through [employer]  Recommendations as above  Follow-up in 3 months or sooner as needed    The company who will be taking care of your CPAP supplies is:     Address: [address] [place] [address] [address], [address]  Phone: [phone] Option #1  Fax: [phone]

## 2023-03-17 ENCOUNTER — OFFICE VISIT (OUTPATIENT)
Dept: FAMILY MEDICINE CLINIC | Facility: CLINIC | Age: 53
End: 2023-03-17

## 2023-03-17 VITALS
DIASTOLIC BLOOD PRESSURE: 70 MMHG | SYSTOLIC BLOOD PRESSURE: 116 MMHG | BODY MASS INDEX: 27.98 KG/M2 | WEIGHT: 225 LBS | HEIGHT: 75 IN

## 2023-03-17 DIAGNOSIS — K52.9 COLITIS: ICD-10-CM

## 2023-03-17 DIAGNOSIS — R19.7 DIARRHEA, UNSPECIFIED TYPE: Primary | ICD-10-CM

## 2023-03-17 RX ORDER — HYOSCYAMINE SULFATE 0.125 MG
125 TABLET ORAL EVERY 6 HOURS PRN
Qty: 30 TABLET | Refills: 0 | Status: SHIPPED | OUTPATIENT
Start: 2023-03-17

## 2023-03-17 RX ORDER — DIPHENOXYLATE HYDROCHLORIDE AND ATROPINE SULFATE 2.5; .025 MG/1; MG/1
1 TABLET ORAL EVERY 6 HOURS PRN
Qty: 30 TABLET | Refills: 0 | Status: SHIPPED | OUTPATIENT
Start: 2023-03-17 | End: 2023-04-16

## 2023-03-17 SDOH — ECONOMIC STABILITY: HOUSING INSECURITY
IN THE LAST 12 MONTHS, WAS THERE A TIME WHEN YOU DID NOT HAVE A STEADY PLACE TO SLEEP OR SLEPT IN A SHELTER (INCLUDING NOW)?: NO

## 2023-03-17 SDOH — ECONOMIC STABILITY: FOOD INSECURITY: WITHIN THE PAST 12 MONTHS, YOU WORRIED THAT YOUR FOOD WOULD RUN OUT BEFORE YOU GOT MONEY TO BUY MORE.: NEVER TRUE

## 2023-03-17 SDOH — ECONOMIC STABILITY: FOOD INSECURITY: WITHIN THE PAST 12 MONTHS, THE FOOD YOU BOUGHT JUST DIDN'T LAST AND YOU DIDN'T HAVE MONEY TO GET MORE.: NEVER TRUE

## 2023-03-17 SDOH — ECONOMIC STABILITY: INCOME INSECURITY: HOW HARD IS IT FOR YOU TO PAY FOR THE VERY BASICS LIKE FOOD, HOUSING, MEDICAL CARE, AND HEATING?: NOT HARD AT ALL

## 2023-03-17 ASSESSMENT — ENCOUNTER SYMPTOMS
SINUS PAIN: 0
DIARRHEA: 1
RHINORRHEA: 0
ANAL BLEEDING: 0
ABDOMINAL PAIN: 1
ABDOMINAL DISTENTION: 1
COUGH: 0
SHORTNESS OF BREATH: 0
BLOOD IN STOOL: 0
NAUSEA: 1

## 2023-03-17 ASSESSMENT — PATIENT HEALTH QUESTIONNAIRE - PHQ9
SUM OF ALL RESPONSES TO PHQ QUESTIONS 1-9: 0
2. FEELING DOWN, DEPRESSED OR HOPELESS: 0
SUM OF ALL RESPONSES TO PHQ QUESTIONS 1-9: 0
SUM OF ALL RESPONSES TO PHQ QUESTIONS 1-9: 0
1. LITTLE INTEREST OR PLEASURE IN DOING THINGS: 0
SUM OF ALL RESPONSES TO PHQ9 QUESTIONS 1 & 2: 0
SUM OF ALL RESPONSES TO PHQ QUESTIONS 1-9: 0

## 2023-03-17 NOTE — PROGRESS NOTES
PROGRESS NOTE    SUBJECTIVE:   Frederic Nunez is a 47 y.o. male seen for a follow up visit regarding the following chief complaint:     Chief Complaint   Patient presents with    Abdominal [address] follow up, Dx with colitis, continues with abdominal cramping and diarrhea, vomiting stops            HPI  Patient presents the office after being seen in the ER with abdominal pain and diarrhea was diagnosed with colitis and started on antibiotics states he is somewhat better but wants more antibiotic    Past Medical History, Past Surgical History, Family history, Social History, and Medications were all reviewed with the patient today and updated as necessary. Current Outpatient Medications   Medication Sig Dispense Refill    hyoscyamine (ANASPAZ;LEVSIN) 125 MCG tablet Take 1 tablet by mouth every 6 hours as needed for Cramping 30 tablet 0    diphenoxylate-atropine (LOMOTIL) 2.5-0.025 MG per tablet Take 1 tablet by mouth every 6 hours as needed for Diarrhea for up to 30 days. Max Daily Amount: 4 tablets 30 tablet 0    omeprazole (PRILOSEC) 20 MG delayed release capsule Take 40 mg by mouth daily      vitamin D3 (CHOLECALCIFEROL) 25 MCG (1000 UT) TABS tablet Take by mouth daily      Multiple Vitamin (MULTI-VITAMIN DAILY PO) Take by mouth daily      amLODIPine (NORVASC) 10 MG tablet Take 1 tablet by mouth daily 90 tablet 3    lisinopril-hydroCHLOROthiazide (PRINZIDE;ZESTORETIC) 20-25 MG per tablet Take 1 tablet by mouth daily 90 tablet 3     No current facility-administered medications for this visit.      No Known Allergies  Patient Active Problem List   Diagnosis    BMI 30.0-30.9,adult    Hypercholesterolemia    Hypertension    CHARLES (obstructive sleep apnea)    Nocturnal hypoxemia    Non-restorative sleep    Snoring    Morning headache     Past Medical History:   Diagnosis Date    Hypercholesterolemia     Hypertension      Past Surgical History:   Procedure Laterality Date    CHOLECYSTECTOMY 2007 Approx    COLONOSCOPY N/A 1/10/2022    COLONOSCOPY/BMI 29 performed by Deangelo Peterson MD at [facility] ENDOSCOPY    COLONOSCOPY FLX DX W/COLLJ SPEC WHEN PFRMD  1/10/2022         KNEE ARTHROSCOPY Right 1992 Approx     Family History   Problem Relation Age of Onset    Emphysema Mother     Anxiety Disorder Mother     No Known Problems Father     Bipolar Disorder Brother      Social History     Tobacco Use    Smoking status: Never     Passive exposure: Never    Smokeless tobacco: Current    Tobacco comments:     Quit smoking: Snuff   Substance Use Topics    Alcohol use: Yes         Review of Systems   Constitutional:  Negative for chills, fatigue and fever. HENT:  Negative for congestion, rhinorrhea and sinus pain. Eyes:  Negative for visual disturbance. Respiratory:  Negative for cough and shortness of breath. Cardiovascular:  Negative for chest pain. Gastrointestinal:  Positive for abdominal distention, abdominal pain, diarrhea and nausea. Negative for anal bleeding and blood in stool. Genitourinary:  Negative for dysuria. Musculoskeletal:  Negative for arthralgias and myalgias. Neurological:  Negative for dizziness, weakness and headaches. Psychiatric/Behavioral: Negative. OBJECTIVE:  /70 (Site: Left Upper Arm, Position: Sitting, Cuff Size: Large Adult)   Ht 6' 2.5\" (1.892 m)   Wt 225 lb (102.1 kg)   BMI 28.50 kg/m²      Physical Exam  Vitals and nursing note reviewed. Constitutional:       Appearance: Normal appearance. HENT:      Head: Normocephalic and atraumatic. Right Ear: Tympanic membrane normal.      Left Ear: Tympanic membrane normal.      Nose: Nose normal.      Mouth/Throat:      Mouth: Mucous membranes are moist.   Eyes:      Conjunctiva/sclera: Conjunctivae normal.      Pupils: Pupils are equal, round, and reactive to light. Cardiovascular:      Rate and Rhythm: Normal rate and regular rhythm. Pulses: Normal pulses.       Heart sounds: Normal heart sounds. Pulmonary:      Effort: Pulmonary effort is normal.      Breath sounds: Normal breath sounds. Abdominal:      General: Abdomen is flat. Bowel sounds are normal.      Palpations: Abdomen is soft. Musculoskeletal:         General: Normal range of motion. Cervical back: Normal range of motion and neck supple. Skin:     General: Skin is warm and dry. Neurological:      General: No focal deficit present. Mental Status: He is alert and oriented to person, place, and time. Psychiatric:         Behavior: Behavior normal.        Medical problems and test results were reviewed with the patient today. No results found for this or any previous visit (from the past 672 hour(s)). ASSESSMENT and PLAN    Visit Diagnoses and Associated Orders       Diarrhea, unspecified type    -  Primary    Culture, Stool [[id number] Custom]   - Future Order    Culture, Campylobacter, Stool [[id number] Custom]   - Future Order    Culture, Yersinia, Stool [[id number] Custom]   - Future Order    Culture, Vibrio, Stool [[id number] Custom]   - Future Order    Ova and Parasite Examination [[id number] Custom]   - Future Order    hyoscyamine (ANASPAZ;LEVSIN) 125 MCG tablet [3783]      diphenoxylate-atropine (LOMOTIL) 2.5-0.025 MG per tablet [2516]      [facility] [[id number] Custom]           Colitis                         Diagnosis Orders   1. Diarrhea, unspecified type  Culture, Stool    Culture, Campylobacter, Stool    Culture, Yersinia, Stool    Culture, Vibrio, Stool    Ova and Parasite Examination    hyoscyamine (ANASPAZ;LEVSIN) 125 MCG tablet    diphenoxylate-atropine (LOMOTIL) 2.5-0.025 MG per tablet    [facility]      2. Colitis        , Owen Vargas was seen today for abdominal cramping. Diagnoses and all orders for this visit:    Diarrhea, unspecified type  -     Culture, Stool; Future  -     Culture, Campylobacter, Stool; Future  -     Culture, Yersinia, Stool;  Future  -     Culture, Vibrio, Stool; Future  -     Ova and Parasite Examination; Future  -     hyoscyamine (ANASPAZ;LEVSIN) 125 MCG tablet; Take 1 tablet by mouth every 6 hours as needed for Cramping  -     diphenoxylate-atropine (LOMOTIL) 2.5-0.025 MG per tablet; Take 1 tablet by mouth every 6 hours as needed for Diarrhea for up to 30 days.  Max Daily Amount: 4 tablets  -     [facility]    Colitis    , We will get stool cultures start him on Levsin for abdominal cramps Lomotil for his diarrhea we will refer him to GI for further evaluation we will await stool cultures and call him back with the results and plan supportive care given

## 2023-03-22 ENCOUNTER — NURSE ONLY (OUTPATIENT)
Dept: FAMILY MEDICINE CLINIC | Facility: CLINIC | Age: 53
End: 2023-03-22

## 2023-03-22 DIAGNOSIS — R19.7 DIARRHEA, UNSPECIFIED TYPE: ICD-10-CM

## 2023-03-24 LAB
BACTERIA SPEC CULT: NORMAL
O+P SPEC MICRO: NORMAL
O+P STL CONC: NORMAL
SERVICE CMNT-IMP: NORMAL
SPECIMEN SOURCE: NORMAL

## 2023-03-27 LAB
SPECIMEN SOURCE: NORMAL
YERSINIA RESULT 1: NORMAL
YERSINIA SPEC CULT: NORMAL

## 2023-04-03 LAB
BACTERIA SPEC CULT: NORMAL
BACTERIA SPEC CULT: NORMAL
CAMPYLOBACTER STL CULT: ABNORMAL
SPECIMEN SOURCE: ABNORMAL

## 2023-05-16 ENCOUNTER — TELEMEDICINE (OUTPATIENT)
Dept: FAMILY MEDICINE CLINIC | Facility: CLINIC | Age: 53
End: 2023-05-16
Payer: COMMERCIAL

## 2023-05-16 DIAGNOSIS — K21.9 GASTROESOPHAGEAL REFLUX DISEASE WITHOUT ESOPHAGITIS: Primary | ICD-10-CM

## 2023-05-16 DIAGNOSIS — I10 ESSENTIAL (PRIMARY) HYPERTENSION: ICD-10-CM

## 2023-05-16 DIAGNOSIS — R19.7 DIARRHEA, UNSPECIFIED TYPE: ICD-10-CM

## 2023-05-16 PROCEDURE — 99442 PR PHYS/QHP TELEPHONE EVALUATION 11-20 MIN: CPT | Performed by: FAMILY MEDICINE

## 2023-05-16 RX ORDER — AMLODIPINE BESYLATE 10 MG/1
10 TABLET ORAL DAILY
Qty: 90 TABLET | Refills: 3 | Status: SHIPPED | OUTPATIENT
Start: 2023-05-16

## 2023-05-16 RX ORDER — OMEPRAZOLE 40 MG/1
40 CAPSULE, DELAYED RELEASE ORAL DAILY
Qty: 90 CAPSULE | Refills: 3 | Status: SHIPPED | OUTPATIENT
Start: 2023-05-16

## 2023-05-16 RX ORDER — LISINOPRIL AND HYDROCHLOROTHIAZIDE 25; 20 MG/1; MG/1
1 TABLET ORAL DAILY
Qty: 90 TABLET | Refills: 3 | Status: SHIPPED | OUTPATIENT
Start: 2023-05-16

## 2023-05-16 ASSESSMENT — PATIENT HEALTH QUESTIONNAIRE - PHQ9
1. LITTLE INTEREST OR PLEASURE IN DOING THINGS: 0
SUM OF ALL RESPONSES TO PHQ QUESTIONS 1-9: 0

## 2023-05-16 ASSESSMENT — ENCOUNTER SYMPTOMS
ABDOMINAL DISTENTION: 1
SHORTNESS OF BREATH: 0
ABDOMINAL PAIN: 1
NAUSEA: 0
DIARRHEA: 1
VOMITING: 0

## 2023-05-16 NOTE — PROGRESS NOTES
PROGRESS NOTE    SUBJECTIVE:   Reji Ingram is a 47 y.o. male seen for a follow up visit regarding the following chief complaint:     Chief Complaint   Patient presents with    Follow-up           HPI patient is doing a phone call office visit follow-up after starting medication to see how he is doing states he has a set up endoscopy tomorrow patient states he is feeling much better but when he pushes his diet he sometimes feels upset stomach      Past Medical History, Past Surgical History, Family history, Social History, and Medications were all reviewed with the patient today and updated as necessary. Current Outpatient Medications   Medication Sig Dispense Refill    lisinopril-hydroCHLOROthiazide (PRINZIDE;ZESTORETIC) 20-25 MG per tablet Take 1 tablet by mouth daily 90 tablet 3    amLODIPine (NORVASC) 10 MG tablet Take 1 tablet by mouth daily 90 tablet 3    omeprazole (PRILOSEC) 40 MG delayed release capsule Take 1 capsule by mouth daily 90 capsule 3    vitamin D3 (CHOLECALCIFEROL) 25 MCG (1000 UT) TABS tablet Take by mouth daily      Multiple Vitamin (MULTI-VITAMIN DAILY PO) Take by mouth daily       No current facility-administered medications for this visit.      No Known Allergies  Patient Active Problem List   Diagnosis    BMI 30.0-30.9,adult    Hypercholesterolemia    Hypertension    CHARLES (obstructive sleep apnea)    Nocturnal hypoxemia    Non-restorative sleep    Snoring    Morning headache     Past Medical History:   Diagnosis Date    Hypercholesterolemia     Hypertension      Past Surgical History:   Procedure Laterality Date    CHOLECYSTECTOMY  2007 Approx    COLONOSCOPY N/A 1/10/2022    COLONOSCOPY/BMI 29 performed by Jeanna Solorio MD at [facility] ENDOSCOPY    COLONOSCOPY FLX DX W/COLLJ SPEC WHEN PFRMD  1/10/2022         KNEE ARTHROSCOPY Right 1992 Approx     Family History   Problem Relation Age of Onset    Emphysema Mother     Anxiety Disorder Mother     No Known Problems Father

## 2023-06-08 ENCOUNTER — OFFICE VISIT (OUTPATIENT)
Dept: FAMILY MEDICINE CLINIC | Facility: CLINIC | Age: 53
End: 2023-06-08
Payer: COMMERCIAL

## 2023-06-08 DIAGNOSIS — R68.89 FLU-LIKE SYMPTOMS: Primary | ICD-10-CM

## 2023-06-08 LAB
EXP DATE SOLUTION: NORMAL
EXP DATE SWAB: NORMAL
EXPIRATION DATE: NORMAL
GRANS ABSOLUTE, POC: 5.3 K/UL
GRANULOCYTES %, POC: 72.2 %
GROUP A STREP ANTIGEN, POC: NEGATIVE
HEMATOCRIT, POC: 45 %
HEMOGLOBIN, POC: 15.1 G/DL
INFLUENZA A ANTIGEN, POC: NEGATIVE
INFLUENZA B ANTIGEN, POC: NEGATIVE
LOT NUMBER POC: NORMAL
LOT NUMBER SOLUTION: NORMAL
LOT NUMBER SWAB: NORMAL
LYMPHOCYTE %, POC: 19.1 %
LYMPHS ABSOLUTE, POC: 1.4 K/UL
MCH, POC: 31.9 PG (ref 20–?)
MCHC, POC: 33.6
MCV, POC: 96.9
MONOCYTE %, POC: 8.7 %
MONOCYTE, ABSOLUTE POC: 0.6 K/UL
MPV, POC: 7 FL
PLATELET COUNT, POC: 251 K/UL
RBC, POC: 4.21 M/UL
RDW, POC: 12.6 %
SARS-COV-2 RNA, POC: NEGATIVE
VALID INTERNAL CONTROL, POC: YES
VALID INTERNAL CONTROL, POC: YES
WBC, POC: 7.4 K/UL

## 2023-06-08 PROCEDURE — 87804 INFLUENZA ASSAY W/OPTIC: CPT | Performed by: FAMILY MEDICINE

## 2023-06-08 PROCEDURE — 96372 THER/PROPH/DIAG INJ SC/IM: CPT | Performed by: FAMILY MEDICINE

## 2023-06-08 PROCEDURE — 99213 OFFICE O/P EST LOW 20 MIN: CPT | Performed by: FAMILY MEDICINE

## 2023-06-08 PROCEDURE — 36415 COLL VENOUS BLD VENIPUNCTURE: CPT | Performed by: FAMILY MEDICINE

## 2023-06-08 PROCEDURE — 85025 COMPLETE CBC W/AUTO DIFF WBC: CPT | Performed by: FAMILY MEDICINE

## 2023-06-08 PROCEDURE — 87880 STREP A ASSAY W/OPTIC: CPT | Performed by: FAMILY MEDICINE

## 2023-06-08 PROCEDURE — 87635 SARS-COV-2 COVID-19 AMP PRB: CPT | Performed by: FAMILY MEDICINE

## 2023-06-08 RX ORDER — TRIAMCINOLONE ACETONIDE 40 MG/ML
40 INJECTION, SUSPENSION INTRA-ARTICULAR; INTRAMUSCULAR ONCE
Status: COMPLETED | OUTPATIENT
Start: 2023-06-08 | End: 2023-06-08

## 2023-06-08 RX ORDER — DOXYCYCLINE 100 MG/1
100 TABLET ORAL 2 TIMES DAILY
Qty: 14 TABLET | Refills: 0 | Status: SHIPPED | OUTPATIENT
Start: 2023-06-08 | End: 2023-06-15

## 2023-06-08 RX ADMIN — TRIAMCINOLONE ACETONIDE 40 MG: 40 INJECTION, SUSPENSION INTRA-ARTICULAR; INTRAMUSCULAR at 16:02

## 2023-06-08 ASSESSMENT — ENCOUNTER SYMPTOMS
NAUSEA: 0
SINUS PAIN: 1
VOICE CHANGE: 0
RHINORRHEA: 0
COUGH: 0
SORE THROAT: 1
VOMITING: 0
WHEEZING: 0
SINUS PRESSURE: 1

## 2023-06-08 ASSESSMENT — PATIENT HEALTH QUESTIONNAIRE - PHQ9
SUM OF ALL RESPONSES TO PHQ QUESTIONS 1-9: 0
SUM OF ALL RESPONSES TO PHQ9 QUESTIONS 1 & 2: 0
1. LITTLE INTEREST OR PLEASURE IN DOING THINGS: 0
SUM OF ALL RESPONSES TO PHQ QUESTIONS 1-9: 0
SUM OF ALL RESPONSES TO PHQ QUESTIONS 1-9: 0
2. FEELING DOWN, DEPRESSED OR HOPELESS: 0
SUM OF ALL RESPONSES TO PHQ QUESTIONS 1-9: 0

## 2023-06-08 NOTE — PROGRESS NOTES
COV  -     AMB POC RAPID INFLUENZA TEST  -     AMB POC RAPID STREP A  -     38345 - Venipuncture  -     triamcinolone acetonide (KENALOG-40) injection 40 mg  -     doxycycline monohydrate (ADOXA) 100 MG tablet; Take 1 tablet by mouth 2 times daily for 7 days    , His CBC flu COVID and strep test all came back normal we will start him on doxycycline 100 mg 1 p.o. twice daily Kenalog shot.   Medicines of Tylenol and Advil supportive care follow-up/symptoms persist or worsen

## 2023-09-29 NOTE — PROGRESS NOTES
[address] [place] [address]  [phone]    Patient Name:  Carolina Mahoney  YOB: 1970      Office Visit 10/3/2023    CHIEF COMPLAINT:    Chief Complaint   Patient presents with    Follow-up    Sleep Apnea         HISTORY OF PRESENT ILLNESS:  Patient is seen today for follow up of CHARLES. He had a home sleep test 12/18/22 with AHI 5.6/hr with desaturations to 78%. He is prescribed APAP 5-15 cm using a full face mask. Download reveals 84% compliance over the last 240 days with average nightly use five hours. Average pressure used as between 9.9 and 14.3 cm. AHI is 2.1 events per hour. Patient denies any problems with the full facemask and states that it is comfortable. He recently received the headgear and supplies. He was waking up 5 to 6 times per night prior to CPAP and now only waking 0-1 times. He feels much more rested in the mornings and denies any daytime sleepiness or napping. He occasionally will take the mask off in the night and will work on keeping it on longer. He has lost 3 pounds since his last visit. He is receiving his supplies through [address] [place] [address].       DOWNLOAD        [place] Sleepiness Scale      10/3/2023    10:04 AM 2/10/2023     8:52 AM   Sleep Medicine   Sitting and reading 1 1   Watching TV 1 1   Sitting, inactive in a public place (e.g. a theatre or a meeting) 0 0   As a passenger in a car for an hour without a break 1 1   Lying down to rest in the afternoon when circumstances permit 1 3   Sitting and talking to someone 0 0   Sitting quietly after a lunch without alcohol 1 1   In a car, while stopped for a few minutes in traffic 0 0   [place] Sleepiness Score 5 7          Past Medical History:   Diagnosis Date    Hypercholesterolemia     Hypertension          Patient Active Problem List   Diagnosis    BMI 30.0-30.9,adult    Hypercholesterolemia    Hypertension    CHARLES (obstructive sleep apnea)    Nocturnal hypoxemia

## 2023-10-03 ENCOUNTER — OFFICE VISIT (OUTPATIENT)
Dept: SLEEP MEDICINE | Age: 53
End: 2023-10-03
Payer: COMMERCIAL

## 2023-10-03 VITALS
DIASTOLIC BLOOD PRESSURE: 78 MMHG | OXYGEN SATURATION: 100 % | HEART RATE: 63 BPM | BODY MASS INDEX: 28.6 KG/M2 | HEIGHT: 75 IN | SYSTOLIC BLOOD PRESSURE: 114 MMHG | WEIGHT: 230 LBS | RESPIRATION RATE: 16 BRPM

## 2023-10-03 DIAGNOSIS — G47.34 NOCTURNAL HYPOXEMIA: ICD-10-CM

## 2023-10-03 DIAGNOSIS — G47.33 OSA (OBSTRUCTIVE SLEEP APNEA): Primary | ICD-10-CM

## 2023-10-03 DIAGNOSIS — R06.83 SNORING: ICD-10-CM

## 2023-10-03 PROCEDURE — 99213 OFFICE O/P EST LOW 20 MIN: CPT | Performed by: NURSE PRACTITIONER

## 2023-10-03 PROCEDURE — 3078F DIAST BP <80 MM HG: CPT | Performed by: NURSE PRACTITIONER

## 2023-10-03 PROCEDURE — 3074F SYST BP LT 130 MM HG: CPT | Performed by: NURSE PRACTITIONER

## 2023-10-03 ASSESSMENT — SLEEP AND FATIGUE QUESTIONNAIRES
HOW LIKELY ARE YOU TO NOD OFF OR FALL ASLEEP WHILE SITTING INACTIVE IN A PUBLIC PLACE: 0
HOW LIKELY ARE YOU TO NOD OFF OR FALL ASLEEP WHILE LYING DOWN TO REST IN THE AFTERNOON WHEN CIRCUMSTANCES PERMIT: 1
HOW LIKELY ARE YOU TO NOD OFF OR FALL ASLEEP WHILE WATCHING TV: 1
HOW LIKELY ARE YOU TO NOD OFF OR FALL ASLEEP IN A CAR, WHILE STOPPED FOR A FEW MINUTES IN TRAFFIC: 0
HOW LIKELY ARE YOU TO NOD OFF OR FALL ASLEEP WHILE SITTING AND TALKING TO SOMEONE: 0
ESS TOTAL SCORE: 5
HOW LIKELY ARE YOU TO NOD OFF OR FALL ASLEEP WHILE SITTING QUIETLY AFTER LUNCH WITHOUT ALCOHOL: 1
HOW LIKELY ARE YOU TO NOD OFF OR FALL ASLEEP WHEN YOU ARE A PASSENGER IN A CAR FOR AN HOUR WITHOUT A BREAK: 1
HOW LIKELY ARE YOU TO NOD OFF OR FALL ASLEEP WHILE SITTING AND READING: 1

## 2023-11-08 ENCOUNTER — NURSE ONLY (OUTPATIENT)
Dept: FAMILY MEDICINE CLINIC | Facility: CLINIC | Age: 53
End: 2023-11-08
Payer: COMMERCIAL

## 2023-11-08 DIAGNOSIS — Z00.00 ROUTINE GENERAL MEDICAL EXAMINATION AT A HEALTH CARE FACILITY: ICD-10-CM

## 2023-11-08 LAB
BASOPHILS # BLD: 0 K/UL (ref 0–0.2)
BASOPHILS NFR BLD: 1 % (ref 0–2)
BILIRUBIN, URINE, POC: NEGATIVE
BLOOD URINE, POC: NEGATIVE
DIFFERENTIAL METHOD BLD: ABNORMAL
EOSINOPHIL # BLD: 0.2 K/UL (ref 0–0.8)
EOSINOPHIL NFR BLD: 5 % (ref 0.5–7.8)
ERYTHROCYTE [DISTWIDTH] IN BLOOD BY AUTOMATED COUNT: 12.5 % (ref 11.9–14.6)
GLUCOSE URINE, POC: NEGATIVE
HCT VFR BLD AUTO: 47.4 % (ref 41.1–50.3)
HGB BLD-MCNC: 16 G/DL (ref 13.6–17.2)
IMM GRANULOCYTES # BLD AUTO: 0 K/UL (ref 0–0.5)
IMM GRANULOCYTES NFR BLD AUTO: 0 % (ref 0–5)
KETONES, URINE, POC: NEGATIVE
LEUKOCYTE ESTERASE, URINE, POC: NEGATIVE
LYMPHOCYTES # BLD: 1 K/UL (ref 0.5–4.6)
LYMPHOCYTES NFR BLD: 26 % (ref 13–44)
MCH RBC QN AUTO: 35.4 PG (ref 26.1–32.9)
MCHC RBC AUTO-ENTMCNC: 33.8 G/DL (ref 31.4–35)
MCV RBC AUTO: 104.9 FL (ref 82–102)
MONOCYTES # BLD: 0.6 K/UL (ref 0.1–1.3)
MONOCYTES NFR BLD: 17 % (ref 4–12)
NEUTS SEG # BLD: 1.9 K/UL (ref 1.7–8.2)
NEUTS SEG NFR BLD: 51 % (ref 43–78)
NITRITE, URINE, POC: NEGATIVE
NRBC # BLD: 0 K/UL (ref 0–0.2)
PH, URINE, POC: 7.5 (ref 4.6–8)
PLATELET # BLD AUTO: 219 K/UL (ref 150–450)
PMV BLD AUTO: 9.6 FL (ref 9.4–12.3)
PROTEIN,URINE, POC: NEGATIVE
PSA SERPL-MCNC: 1 NG/ML
RBC # BLD AUTO: 4.52 M/UL (ref 4.23–5.6)
SPECIFIC GRAVITY, URINE, POC: 1.02 (ref 1–1.03)
URINALYSIS CLARITY, POC: CLEAR
URINALYSIS COLOR, POC: YELLOW
UROBILINOGEN, POC: NORMAL
WBC # BLD AUTO: 3.7 K/UL (ref 4.3–11.1)

## 2023-11-08 PROCEDURE — 36415 COLL VENOUS BLD VENIPUNCTURE: CPT | Performed by: FAMILY MEDICINE

## 2023-11-08 PROCEDURE — 81002 URINALYSIS NONAUTO W/O SCOPE: CPT | Performed by: FAMILY MEDICINE

## 2023-11-09 LAB
25(OH)D3 SERPL-MCNC: 60.7 NG/ML (ref 30–100)
ALBUMIN SERPL-MCNC: 4.3 G/DL (ref 3.5–5)
ALBUMIN/GLOB SERPL: 1.2 (ref 0.4–1.6)
ALP SERPL-CCNC: 52 U/L (ref 50–136)
ALT SERPL-CCNC: 33 U/L (ref 12–65)
ANION GAP SERPL CALC-SCNC: 7 MMOL/L (ref 2–11)
AST SERPL-CCNC: 20 U/L (ref 15–37)
BILIRUB SERPL-MCNC: 0.8 MG/DL (ref 0.2–1.1)
BUN SERPL-MCNC: 11 MG/DL (ref 6–23)
CALCIUM SERPL-MCNC: 9.5 MG/DL (ref 8.3–10.4)
CHLORIDE SERPL-SCNC: 100 MMOL/L (ref 101–110)
CHOLEST SERPL-MCNC: 221 MG/DL
CO2 SERPL-SCNC: 29 MMOL/L (ref 21–32)
CREAT SERPL-MCNC: 1.1 MG/DL (ref 0.8–1.5)
GLOBULIN SER CALC-MCNC: 3.5 G/DL (ref 2.8–4.5)
GLUCOSE SERPL-MCNC: 91 MG/DL (ref 65–100)
HDLC SERPL-MCNC: 55 MG/DL (ref 40–60)
HDLC SERPL: 4
LDLC SERPL CALC-MCNC: 127.4 MG/DL
POTASSIUM SERPL-SCNC: 4.3 MMOL/L (ref 3.5–5.1)
PROT SERPL-MCNC: 7.8 G/DL (ref 6.3–8.2)
SODIUM SERPL-SCNC: 136 MMOL/L (ref 133–143)
TRIGL SERPL-MCNC: 193 MG/DL (ref 35–150)
TSH W FREE THYROID IF ABNORMAL: 1.58 UIU/ML (ref 0.36–3.74)
VLDLC SERPL CALC-MCNC: 38.6 MG/DL (ref 6–23)

## 2023-12-14 ENCOUNTER — OFFICE VISIT (OUTPATIENT)
Dept: FAMILY MEDICINE CLINIC | Facility: CLINIC | Age: 53
End: 2023-12-14
Payer: COMMERCIAL

## 2023-12-14 VITALS
DIASTOLIC BLOOD PRESSURE: 72 MMHG | HEART RATE: 90 BPM | BODY MASS INDEX: 29.22 KG/M2 | SYSTOLIC BLOOD PRESSURE: 118 MMHG | WEIGHT: 235 LBS | HEIGHT: 75 IN

## 2023-12-14 DIAGNOSIS — R10.84 ABDOMINAL PAIN, GENERALIZED: Primary | ICD-10-CM

## 2023-12-14 LAB
GRANS ABSOLUTE, POC: 6.5 K/UL
GRANULOCYTES %, POC: 78.7 %
HEMATOCRIT, POC: 49.2 %
HEMOGLOBIN, POC: 15.9 G/DL
LYMPHOCYTE %, POC: 14.8 %
LYMPHS ABSOLUTE, POC: 1.2 K/UL
MCH, POC: 32 PG (ref 20–?)
MCHC, POC: 32.3
MCV, POC: 96.9
MONOCYTE %, POC: 6.5 %
MONOCYTE, ABSOLUTE POC: 0.5 K/UL
MPV, POC: 7.1 FL
PLATELET COUNT, POC: 274 K/UL
RBC, POC: 4.44 M/UL
RDW, POC: 12.9 %
WBC, POC: 8.2 K/UL

## 2023-12-14 PROCEDURE — 85025 COMPLETE CBC W/AUTO DIFF WBC: CPT | Performed by: FAMILY MEDICINE

## 2023-12-14 PROCEDURE — 36415 COLL VENOUS BLD VENIPUNCTURE: CPT | Performed by: FAMILY MEDICINE

## 2023-12-14 PROCEDURE — 3074F SYST BP LT 130 MM HG: CPT | Performed by: FAMILY MEDICINE

## 2023-12-14 PROCEDURE — 99213 OFFICE O/P EST LOW 20 MIN: CPT | Performed by: FAMILY MEDICINE

## 2023-12-14 PROCEDURE — 3078F DIAST BP <80 MM HG: CPT | Performed by: FAMILY MEDICINE

## 2024-04-02 ENCOUNTER — TELEPHONE (OUTPATIENT)
Dept: SLEEP MEDICINE | Age: 54
End: 2024-04-02

## 2024-05-02 ENCOUNTER — OFFICE VISIT (OUTPATIENT)
Dept: SLEEP MEDICINE | Age: 54
End: 2024-05-02
Payer: COMMERCIAL

## 2024-05-02 VITALS
WEIGHT: 233 LBS | SYSTOLIC BLOOD PRESSURE: 111 MMHG | DIASTOLIC BLOOD PRESSURE: 69 MMHG | OXYGEN SATURATION: 92 % | HEART RATE: 117 BPM | BODY MASS INDEX: 29.9 KG/M2 | RESPIRATION RATE: 17 BRPM | HEIGHT: 74 IN

## 2024-05-02 DIAGNOSIS — G47.34 NOCTURNAL HYPOXEMIA: ICD-10-CM

## 2024-05-02 DIAGNOSIS — G47.33 OSA (OBSTRUCTIVE SLEEP APNEA): Primary | ICD-10-CM

## 2024-05-02 PROCEDURE — 3074F SYST BP LT 130 MM HG: CPT | Performed by: PHYSICIAN ASSISTANT

## 2024-05-02 PROCEDURE — 3078F DIAST BP <80 MM HG: CPT | Performed by: PHYSICIAN ASSISTANT

## 2024-05-02 PROCEDURE — 99213 OFFICE O/P EST LOW 20 MIN: CPT | Performed by: PHYSICIAN ASSISTANT

## 2024-05-02 RX ORDER — ESCITALOPRAM OXALATE 5 MG/1
5 TABLET ORAL DAILY
COMMUNITY

## 2024-05-02 ASSESSMENT — SLEEP AND FATIGUE QUESTIONNAIRES
HOW LIKELY ARE YOU TO NOD OFF OR FALL ASLEEP IN A CAR, WHILE STOPPED FOR A FEW MINUTES IN TRAFFIC: WOULD NEVER DOZE
HOW LIKELY ARE YOU TO NOD OFF OR FALL ASLEEP WHILE SITTING AND READING: WOULD NEVER DOZE
HOW LIKELY ARE YOU TO NOD OFF OR FALL ASLEEP WHILE LYING DOWN TO REST IN THE AFTERNOON WHEN CIRCUMSTANCES PERMIT: HIGH CHANCE OF DOZING
HOW LIKELY ARE YOU TO NOD OFF OR FALL ASLEEP WHEN YOU ARE A PASSENGER IN A CAR FOR AN HOUR WITHOUT A BREAK: SLIGHT CHANCE OF DOZING
ESS TOTAL SCORE: 4
HOW LIKELY ARE YOU TO NOD OFF OR FALL ASLEEP WHILE SITTING QUIETLY AFTER LUNCH WITHOUT ALCOHOL: WOULD NEVER DOZE
HOW LIKELY ARE YOU TO NOD OFF OR FALL ASLEEP WHILE SITTING INACTIVE IN A PUBLIC PLACE: WOULD NEVER DOZE
HOW LIKELY ARE YOU TO NOD OFF OR FALL ASLEEP WHILE SITTING AND TALKING TO SOMEONE: WOULD NEVER DOZE
HOW LIKELY ARE YOU TO NOD OFF OR FALL ASLEEP WHILE WATCHING TV: WOULD NEVER DOZE

## 2024-05-02 NOTE — PROGRESS NOTES
[facility]  [address]  [place] [address]  [phone]    Patient Name:  John Ravi  YOB: 1970      Office Visit 5/2/2024    CHIEF COMPLAINT:    Chief Complaint   Patient presents with    Sleep Apnea    Follow-up         HISTORY OF PRESENT ILLNESS:  Patient is an 54 y.o. male seen today for follow up of CHARLES. Pt had a PSG/HST on 12/18/22 with an AHI of 5.6/hr with desaturations to 78%. Pt is on CPAP 5-15 cm H2O.   Pt reports that he is doing ok. Since his last appt, he had a SBO requiring exploratory laparoscopy and now has a colostomy. Pt is doing better and is waiting to schedule a reversal surgery soon.   Pt reports that he is doing well with PAP therapy. He is sleeping well and often sleeps through the night. He reports good energy during the day. Pt does not take any sleep aids.   Pt is using a full face mask. He gets his supplies through [facility]. He denies any issues.   Pt has good compliance with use 83/90 days with an average use of 5 hrs and 40 mins per night. Pt has a mask leak of 0.2L/min with an AHI of 1.2/hr. Pt is benefiting and tolerating PAP therapy.             5/2/2024     7:43 AM 10/3/2023    10:04 AM 2/10/2023     8:52 AM   Sleep Medicine   Sitting and reading 0 1 1   Watching TV 0 1 1   Sitting, inactive in a public place (e.g. a theatre or a meeting) 0 0 0   As a passenger in a car for an hour without a break 1 1 1   Lying down to rest in the afternoon when circumstances permit 3 1 3   Sitting and talking to someone 0 0 0   Sitting quietly after a lunch without alcohol 0 1 1   In a car, while stopped for a few minutes in traffic 0 0 0   [place] Sleepiness Score 4 5 7         Past Medical History:   Diagnosis Date    Hypercholesterolemia     Hypertension          Patient Active Problem List   Diagnosis    BMI 30.0-30.9,adult    Hypercholesterolemia    Hypertension    CHARLES (obstructive sleep apnea)    Nocturnal hypoxemia    Non-restorative

## 2024-07-10 DIAGNOSIS — I10 ESSENTIAL (PRIMARY) HYPERTENSION: ICD-10-CM

## 2024-07-10 RX ORDER — LISINOPRIL AND HYDROCHLOROTHIAZIDE 20; 25 MG/1; MG/1
1 TABLET ORAL DAILY
Qty: 90 TABLET | Refills: 3 | OUTPATIENT
Start: 2024-07-10

## 2024-07-10 RX ORDER — AMLODIPINE BESYLATE 10 MG/1
10 TABLET ORAL DAILY
Qty: 90 TABLET | Refills: 3 | OUTPATIENT
Start: 2024-07-10

## 2025-05-19 ASSESSMENT — SLEEP AND FATIGUE QUESTIONNAIRES
HOW LIKELY ARE YOU TO NOD OFF OR FALL ASLEEP IN A CAR, WHILE STOPPED FOR A FEW MINUTES IN TRAFFIC: WOULD NEVER DOZE
HOW LIKELY ARE YOU TO NOD OFF OR FALL ASLEEP WHILE WATCHING TV: SLIGHT CHANCE OF DOZING
HOW LIKELY ARE YOU TO NOD OFF OR FALL ASLEEP WHILE LYING DOWN TO REST IN THE AFTERNOON WHEN CIRCUMSTANCES PERMIT: SLIGHT CHANCE OF DOZING
HOW LIKELY ARE YOU TO NOD OFF OR FALL ASLEEP WHILE SITTING AND TALKING TO SOMEONE: WOULD NEVER DOZE
HOW LIKELY ARE YOU TO NOD OFF OR FALL ASLEEP WHEN YOU ARE A PASSENGER IN A CAR FOR AN HOUR WITHOUT A BREAK: SLIGHT CHANCE OF DOZING
HOW LIKELY ARE YOU TO NOD OFF OR FALL ASLEEP WHILE WATCHING TV: SLIGHT CHANCE OF DOZING
HOW LIKELY ARE YOU TO NOD OFF OR FALL ASLEEP IN A CAR, WHILE STOPPED FOR A FEW MINUTES IN TRAFFIC: WOULD NEVER DOZE
HOW LIKELY ARE YOU TO NOD OFF OR FALL ASLEEP WHILE SITTING AND TALKING TO SOMEONE: WOULD NEVER DOZE
HOW LIKELY ARE YOU TO NOD OFF OR FALL ASLEEP WHILE SITTING AND READING: SLIGHT CHANCE OF DOZING
HOW LIKELY ARE YOU TO NOD OFF OR FALL ASLEEP WHILE SITTING INACTIVE IN A PUBLIC PLACE: SLIGHT CHANCE OF DOZING
HOW LIKELY ARE YOU TO NOD OFF OR FALL ASLEEP WHILE SITTING INACTIVE IN A PUBLIC PLACE: SLIGHT CHANCE OF DOZING
HOW LIKELY ARE YOU TO NOD OFF OR FALL ASLEEP WHILE SITTING QUIETLY AFTER LUNCH WITHOUT ALCOHOL: WOULD NEVER DOZE
HOW LIKELY ARE YOU TO NOD OFF OR FALL ASLEEP WHILE LYING DOWN TO REST IN THE AFTERNOON WHEN CIRCUMSTANCES PERMIT: SLIGHT CHANCE OF DOZING
HOW LIKELY ARE YOU TO NOD OFF OR FALL ASLEEP WHILE SITTING AND READING: SLIGHT CHANCE OF DOZING
HOW LIKELY ARE YOU TO NOD OFF OR FALL ASLEEP WHEN YOU ARE A PASSENGER IN A CAR FOR AN HOUR WITHOUT A BREAK: SLIGHT CHANCE OF DOZING
HOW LIKELY ARE YOU TO NOD OFF OR FALL ASLEEP WHILE SITTING QUIETLY AFTER LUNCH WITHOUT ALCOHOL: WOULD NEVER DOZE
ESS TOTAL SCORE: 5

## 2025-05-21 NOTE — PROGRESS NOTES
[facility], [facility]    Physical Activity     Days of Exercise per Week: 0 days     Minutes of Exercise per Session: 0     Total Minutes of Exercise per Week: 0   Stress: Stress Concern Present (3/12/2025)    Received from [employer]    Stress     Feeling of Stress : To some extent   Social Connections: Moderately Integrated (3/12/2025)    Received from [employer]    Social Connections     Frequency of Communication with Friends and Family: More than three times a week     Frequency of Social Gatherings with Friends and Family: Once a week   Intimate Partner Violence: Unknown (3/12/2025)    Received from [employer]    Intimate Partner Violence     Fear of Current or Ex-Partner: No     Emotionally Abused: Not asked     Physically Abused: Not asked     Sexually Abused: Not asked   Housing Stability: Not At Risk (12/19/2023)    Received from [employer], [facility]    Housing Stability     Was there a time when you did not have a steady place to sleep: No     Worried that the place you are staying is making you sick: No         Family History   Problem Relation Age of Onset    Emphysema Mother     Anxiety Disorder Mother     No Known Problems Father     Bipolar Disorder Brother          No Known Allergies      Current Outpatient Medications   Medication Sig    buPROPion (WELLBUTRIN XL) 150 MG extended release tablet Take 1 tablet by mouth daily    Potassium (POTASSIMIN PO) Take by mouth    lisinopril-hydroCHLOROthiazide (PRINZIDE;ZESTORETIC) 20-25 MG per tablet Take 1 tablet by mouth daily    amLODIPine (NORVASC) 10 MG tablet Take 1 tablet by mouth daily    omeprazole (PRILOSEC) 40 MG delayed release capsule Take 1 capsule by mouth daily    vitamin D3 (CHOLECALCIFEROL) 25 MCG (1000 UT) TABS tablet Take by mouth daily    Multiple Vitamin (MULTI-VITAMIN DAILY PO) Take by mouth daily    escitalopram (LEXAPRO) 5 MG tablet Take 1 tablet by mouth daily (Patient not taking: Reported on 5/22/2025)     No

## 2025-05-22 ENCOUNTER — OFFICE VISIT (OUTPATIENT)
Dept: SLEEP MEDICINE | Age: 55
End: 2025-05-22
Payer: COMMERCIAL

## 2025-05-22 VITALS
RESPIRATION RATE: 18 BRPM | WEIGHT: 235.4 LBS | TEMPERATURE: 98.9 F | OXYGEN SATURATION: 97 % | BODY MASS INDEX: 29.27 KG/M2 | HEART RATE: 84 BPM | HEIGHT: 75 IN | SYSTOLIC BLOOD PRESSURE: 124 MMHG | DIASTOLIC BLOOD PRESSURE: 66 MMHG

## 2025-05-22 DIAGNOSIS — G47.33 OSA (OBSTRUCTIVE SLEEP APNEA): Primary | ICD-10-CM

## 2025-05-22 PROCEDURE — 3074F SYST BP LT 130 MM HG: CPT | Performed by: STUDENT IN AN ORGANIZED HEALTH CARE EDUCATION/TRAINING PROGRAM

## 2025-05-22 PROCEDURE — 99214 OFFICE O/P EST MOD 30 MIN: CPT | Performed by: STUDENT IN AN ORGANIZED HEALTH CARE EDUCATION/TRAINING PROGRAM

## 2025-05-22 PROCEDURE — 3078F DIAST BP <80 MM HG: CPT | Performed by: STUDENT IN AN ORGANIZED HEALTH CARE EDUCATION/TRAINING PROGRAM

## 2025-05-22 RX ORDER — BUPROPION HYDROCHLORIDE 150 MG/1
150 TABLET ORAL DAILY
COMMUNITY
Start: 2025-03-17

## (undated) DEVICE — CONNECTOR TBNG OD5-7MM O2 END DISP

## (undated) DEVICE — CANNULA NSL ORAL AD FOR CAPNOFLEX CO2 O2 AIRLFE

## (undated) DEVICE — KENDALL RADIOLUCENT FOAM MONITORING ELECTRODE RECTANGULAR SHAPE: Brand: KENDALL